# Patient Record
Sex: MALE | Race: BLACK OR AFRICAN AMERICAN | NOT HISPANIC OR LATINO | Employment: FULL TIME | URBAN - METROPOLITAN AREA
[De-identification: names, ages, dates, MRNs, and addresses within clinical notes are randomized per-mention and may not be internally consistent; named-entity substitution may affect disease eponyms.]

---

## 2018-08-28 ENCOUNTER — HOSPITAL ENCOUNTER (OUTPATIENT)
Dept: RADIOLOGY | Facility: HOSPITAL | Age: 43
Discharge: HOME | End: 2018-08-28
Attending: NURSE PRACTITIONER
Payer: MEDICAID

## 2018-08-28 DIAGNOSIS — Z00.00 ROUTINE GENERAL MEDICAL EXAMINATION AT A HEALTH CARE FACILITY: ICD-10-CM

## 2018-08-28 DIAGNOSIS — Z00.00 ROUTINE GENERAL MEDICAL EXAMINATION AT A HEALTH CARE FACILITY: Primary | ICD-10-CM

## 2018-08-28 PROCEDURE — 71046 X-RAY EXAM CHEST 2 VIEWS: CPT | Mod: TC,FY,PO

## 2018-12-17 DIAGNOSIS — J45.20 MILD INTERMITTENT ASTHMA IN ADULT WITHOUT COMPLICATION: Primary | ICD-10-CM

## 2019-01-07 ENCOUNTER — HOSPITAL ENCOUNTER (OUTPATIENT)
Dept: PULMONOLOGY | Facility: HOSPITAL | Age: 44
Discharge: HOME | End: 2019-01-07
Attending: INTERNAL MEDICINE
Payer: MEDICAID

## 2019-01-07 DIAGNOSIS — J45.20 MILD INTERMITTENT ASTHMA IN ADULT WITHOUT COMPLICATION: ICD-10-CM

## 2019-01-07 LAB
BRPFT: ABNORMAL
DLCO ADJ PRE: 26.82 ML/(MIN*MMHG) (ref 26.7–40.56)
DLCO SINGLE BREATH LLN: 26.7
DLCO SINGLE BREATH PRE REF: 79.8 %
DLCO SINGLE BREATH REF: 33.63
DLCOC SBVA LLN: 3.41
DLCOC SBVA PRE REF: 93.8 %
DLCOC SBVA REF: 4.61
DLCOC SINGLE BREATH LLN: 26.7
DLCOC SINGLE BREATH PRE REF: 79.8 %
DLCOC SINGLE BREATH REF: 33.63
DLCOVA LLN: 3.41
DLCOVA PRE REF: 93.8 %
DLCOVA PRE: 4.32 ML/(MIN*MMHG*L) (ref 3.41–5.8)
DLCOVA REF: 4.61
DLVAADJ PRE: 4.32 ML/(MIN*MMHG*L) (ref 3.41–5.8)
ERV LLN: 1.46
ERV REF: 1.46
ERVN2 LLN: 1.46
ERVN2 PRE REF: 41.6 %
ERVN2 PRE: 0.61 L (ref 1.46–1.46)
ERVN2 REF: 1.46
FEF 25 75 LLN: 1.85
FEF 25 75 PRE REF: 54 %
FEF 25 75 REF: 3.59
FEV1 FVC LLN: 71
FEV1 FVC PRE REF: 86.3 %
FEV1 FVC REF: 81
FEV1 LLN: 2.81
FEV1 PRE REF: 79.9 %
FEV1 REF: 3.67
FEV6 LLN: 3.45
FEV6 PRE REF: 95 %
FEV6 PRE: 4.18 L (ref 3.45–5.35)
FEV6 REF: 4.4
FRCN2 LLN: 2.5
FRCN2 PRE REF: 63.5 %
FRCN2 REF: 3.49
FRCPLETH LLN: 2.5
FRCPLETH REF: 3.49
FVC LLN: 3.53
FVC PRE REF: 92.2 %
FVC REF: 4.54
IVC PRE: 4.23 L (ref 3.53–5.55)
IVC SINGLE BREATH LLN: 3.53
IVC SINGLE BREATH PRE REF: 93.1 %
IVC SINGLE BREATH REF: 4.54
MVV LLN: 138
MVV REF: 162
PEF LLN: 6.69
PEF PRE REF: 100.4 %
PEF REF: 9.41
PRE DLCO: 26.82 ML/(MIN*MMHG) (ref 26.7–40.56)
PRE FEF 25 75: 1.94 L/S (ref 1.85–5.33)
PRE FET 100: 6.45 SEC
PRE FEV1 FVC: 69.88 % (ref 70.87–91.06)
PRE FEV1: 2.93 L (ref 2.81–4.52)
PRE FRC N2: 2.22 L
PRE FVC: 4.19 L (ref 3.53–5.55)
PRE PEF: 9.45 L/S (ref 6.69–12.13)
RAW LLN: 3.06
RAW REF: 3.06
RV LLN: 1.36
RV REF: 2.03
RVN2 LLN: 1.36
RVN2 PRE REF: 76 %
RVN2 PRE: 1.54 L (ref 1.36–2.7)
RVN2 REF: 2.03
RVN2TLCN2 LLN: 21
RVN2TLCN2 PRE REF: 89.4 %
RVN2TLCN2 PRE: 26.76 % (ref 20.97–38.93)
RVN2TLCN2 REF: 30
RVTLC LLN: 21
RVTLC REF: 30
TLC LLN: 6.15
TLC REF: 7.3
TLCN2 LLN: 6.15
TLCN2 PRE REF: 78.9 %
TLCN2 PRE: 5.76 L (ref 6.15–8.45)
TLCN2 REF: 7.3
VA PRE: 6.21 L (ref 7.15–7.15)
VA SINGLE BREATH LLN: 7.15
VA SINGLE BREATH PRE REF: 86.8 %
VA SINGLE BREATH REF: 7.15
VC LLN: 3.53
VC REF: 4.54
VCMAXN2 LLN: 3.53
VCMAXN2 PRE REF: 92.9 %
VCMAXN2 PRE: 4.22 L (ref 3.53–5.55)
VCMAXN2 REF: 4.54

## 2019-01-07 PROCEDURE — 94729 DIFFUSING CAPACITY: CPT

## 2019-01-07 PROCEDURE — 94727 GAS DIL/WSHOT DETER LNG VOL: CPT

## 2019-01-07 PROCEDURE — 94010 BREATHING CAPACITY TEST: CPT

## 2019-10-22 ENCOUNTER — TELEPHONE (OUTPATIENT)
Dept: OPHTHALMOLOGY | Facility: CLINIC | Age: 44
End: 2019-10-22

## 2019-10-22 NOTE — TELEPHONE ENCOUNTER
----- Message from Kelin Jimenez sent at 10/22/2019 11:56 AM CDT -----  Pt being referred by [facility] (Dr. Abbasi) for collagen cross linking     Pt contact [phone]

## 2019-11-05 ENCOUNTER — OFFICE VISIT (OUTPATIENT)
Dept: OPHTHALMOLOGY | Facility: CLINIC | Age: 44
End: 2019-11-05
Attending: OPHTHALMOLOGY
Payer: MEDICAID

## 2019-11-05 DIAGNOSIS — H18.603 KERATOCONUS OF BOTH EYES: Primary | ICD-10-CM

## 2019-11-05 PROCEDURE — 99999 PR PBB SHADOW E&M-EST. PATIENT-LVL II: CPT | Mod: PBBFAC,,, | Performed by: OPHTHALMOLOGY

## 2019-11-05 PROCEDURE — 92004 COMPRE OPH EXAM NEW PT 1/>: CPT | Mod: S$PBB,,, | Performed by: OPHTHALMOLOGY

## 2019-11-05 PROCEDURE — 99999 PR PBB SHADOW E&M-EST. PATIENT-LVL II: ICD-10-PCS | Mod: PBBFAC,,, | Performed by: OPHTHALMOLOGY

## 2019-11-05 PROCEDURE — 99212 OFFICE O/P EST SF 10 MIN: CPT | Mod: PBBFAC | Performed by: OPHTHALMOLOGY

## 2019-11-05 PROCEDURE — 92004 PR EYE EXAM, NEW PATIENT,COMPREHESV: ICD-10-PCS | Mod: S$PBB,,, | Performed by: OPHTHALMOLOGY

## 2019-11-05 NOTE — LETTER
November 5, 2019      June Lambert MD  [address] [facility] [address]           [facility] [place] [address] [facility] [address]  [address] [facility] [address]  Phone: [phone]  Fax: [phone]          Patient: Cristóbal Gruber   MR Number: [id number]   YOB: 1975   Date of Visit: 11/5/2019       Dear Dr. June Lambert:    Thank you for referring Cristóbal Gruber to me for evaluation. Attached you will find relevant portions of my assessment and plan of care.    If you have questions, please do not hesitate to call me. I look forward to following Cristóbal Gruber along with you.    Sincerely,    Amie Garcia MD    Enclosure  CC:  No Recipients    If you would like to receive this communication electronically, please contact [email or web address] or [phone] to request more information on [employer] access.    For providers and/or their staff who would like to refer a patient to [facility], please contact us through our one-stop-shop provider referral line, [facility], at [phone].    If you feel you have received this communication in error or would no longer like to receive these types of communications, please e-mail [email or web address]

## 2019-11-05 NOTE — PROGRESS NOTES
HPI     Patient reports today for a crosslinking eval in the left eye. Patient is   a current eye glass wearer and started wearing in the right eye due to the   Keratoconus and in order to avoid going through a corneal transplant.   Patient did not wear hard lens in today. He would like to see if he is a   candidate for crosslinking in hopes of stopping the progression of the   keratoconus .    Patient has be diagnosed a few years ago. In both eyes with the right   being the worse        Last edited by Arpan Michel on 11/5/2019 11:39 AM. (History)            Assessment /Plan     For exam results, see Encounter Report.    Keratoconus of both eyes      The diagnosis of keratoconus and its etiology and clinical course were discussed.  Treatment with the use of specialty contact lenses, collagen cross linking, and corneal transplantation was explained in detail.    This patient exhibits signs of progression of keratoconus and failure of conservative treatments with spectacles and/or contact lenses.   Disease progression is indicated by   - An increase of >1D in the Kmax  - An increase of >1D of astigmatism in the MRx  - An increase in myopia of >0.50D on MRx    I recommend treatment with Collagen Crosslinking using the Avedro FDA approved epi-off protocol with Photrexa and the KXL System, in order to stabilize this condition.    Plan:   KXL treatment OS, then consider OD but thin    I have informed the patient that we will seek insurance pre-approval, but due to the lack of reimbursement from June-Dec 2018, we will collect a fee for service deposit of $2,000 as pre payment.  If insurance should provide payment for the CXL drug and procedure, then we will refund the patient's deposit.  Plan for PAP

## 2019-11-15 ENCOUNTER — TELEPHONE (OUTPATIENT)
Dept: OPHTHALMOLOGY | Facility: CLINIC | Age: 44
End: 2019-11-15

## 2019-11-15 NOTE — TELEPHONE ENCOUNTER
Sent message through portal that we needed to reschedule his cxl procedure that is scheduled for 3/26/20. SDF

## 2019-12-04 ENCOUNTER — TELEPHONE (OUTPATIENT)
Dept: OPTOMETRY | Facility: CLINIC | Age: 44
End: 2019-12-04

## 2020-11-13 ENCOUNTER — OFFICE VISIT (OUTPATIENT)
Dept: OPHTHALMOLOGY | Facility: CLINIC | Age: 45
End: 2020-11-13
Payer: MEDICAID

## 2020-11-13 DIAGNOSIS — H18.603 KERATOCONUS OF BOTH EYES: Primary | ICD-10-CM

## 2020-11-13 DIAGNOSIS — H52.213 IRREGULAR ASTIGMATISM OF BOTH EYES: ICD-10-CM

## 2020-11-13 PROCEDURE — 99999 PR PBB SHADOW E&M-EST. PATIENT-LVL I: ICD-10-PCS | Mod: PBBFAC,,, | Performed by: OPHTHALMOLOGY

## 2020-11-13 PROCEDURE — 92014 PR EYE EXAM, EST PATIENT,COMPREHESV: ICD-10-PCS | Mod: S$PBB,,, | Performed by: OPHTHALMOLOGY

## 2020-11-13 PROCEDURE — 92025 CPTRIZED CORNEAL TOPOGRAPHY: CPT | Mod: PBBFAC | Performed by: OPHTHALMOLOGY

## 2020-11-13 PROCEDURE — 92014 COMPRE OPH EXAM EST PT 1/>: CPT | Mod: S$PBB,,, | Performed by: OPHTHALMOLOGY

## 2020-11-13 PROCEDURE — 99211 OFF/OP EST MAY X REQ PHY/QHP: CPT | Mod: PBBFAC,25 | Performed by: OPHTHALMOLOGY

## 2020-11-13 PROCEDURE — 92025 COMPUTERIZED CORNEAL TOPOGRAPHY: ICD-10-PCS | Mod: 26,S$PBB,, | Performed by: OPHTHALMOLOGY

## 2020-11-13 PROCEDURE — 99999 PR PBB SHADOW E&M-EST. PATIENT-LVL I: CPT | Mod: PBBFAC,,, | Performed by: OPHTHALMOLOGY

## 2020-11-13 NOTE — PROGRESS NOTES
HPI     Dr. Garcia pt/Dr. Lambert ref    KCN OU     Combigan OU BID    44 YO male here for kcn eval. Saw Dr. Garcia in past however was   transferring jobs at the time and held off cxl to see if ins at new job   would cover procedure. Present today to discuss cxl OS since OD not good   candidate for procedure. Pt reports he wears scleral C/L occasionally-does   not wear them full time. Pt feels that OS KCN is worsening. No eye pain   nor other ocular complaints.     Last edited by Fatou Louise on 11/13/2020  9:40 AM. (History)            Assessment /Plan     For exam results, see Encounter Report.    Keratoconus of both eyes  -     Computerized corneal topography    Irregular astigmatism of both eyes  -     Computerized corneal topography        The diagnosis of keratoconus and its etiology and clinical course were discussed.  Treatment with the use of specialty contact lenses, collagen cross linking, and corneal transplantation was explained in detail.    This patient exhibits signs of progression of keratoconus and failure of conservative treatments with spectacles and/or contact lenses.   Disease progression is indicated by   - An increase of >1D in the Kmax  - An increase of >1D of astigmatism in the MRx  - An increase in myopia of >0.50D on MRx    I recommend treatment with Collagen Crosslinking using the Avedro FDA approved epi-off protocol with Photrexa and the KXL System, in order to stabilize this condition.    Plan:   KXL treatment OS, then consider OD but thin    I have informed the patient that we will seek insurance pre-approval, but due to the lack of reimbursement from June-Dec 2018, we will collect a fee for service deposit of $2,000 as pre payment.  If insurance should provide payment for the CXL drug and procedure, then we will refund the patient's deposit.  Plan for PAP

## 2020-11-13 NOTE — LETTER
[facility] [address]  [address]  [facility] [address]  Phone: [phone]  Fax: [phone]   November 13, 2020    June Lambert MD  [address] [facility] [address]    Patient: Cristóbal Gruber   MR Number: [id number]   YOB: 1975   Date of Visit: 11/13/2020       Dear Dr. Lambert:    Thank you for referring Cristóbal Gruber to me for evaluation. Here is my assessment and plan of care:       Keratoconus of both eyes OD>OS     The diagnosis of keratoconus and its etiology and clinical course were discussed.  Treatment with the use of specialty contact lenses, collagen cross linking, and corneal transplantation was explained in detail.    This patient exhibits signs of progression of keratoconus and failure of conservative treatments with spectacles and/or contact lenses.   Disease progression is indicated by   - An increase of >1D in the Kmax  - An increase of >1D of astigmatism in the MRx  - An increase in myopia of >0.50D on MRx    I recommend treatment with Collagen Crosslinking using the Avedro FDA approved epi-off protocol with Photrexa and the KXL System, in order to stabilize this condition.    Plan:   KXL treatment OS, then consider OD but thin       Below you will find my full exam findings. If you have questions, please do not hesitate to call me. I look forward to following Mr. Cristóbal Gruber along with you.    Sincerely,        Kelly Iglesias MD       CC  No Recipients             Base Eye Exam     Visual Acuity (Snellen - Linear)       Right Left    Dist cc 20/500 20/20    Correction: Glasses          Tonometry (Tonopen, 9:40 AM)       Right Left    Pressure 14 16          Pachymetry (11/13/2020)       Right Left    Thickness 382 491          Neuro/Psych     Oriented x3: Yes    Mood/Affect: Normal            Slit Lamp and Fundus Exam     External Exam       Right Left    External Normal Normal          Slit Lamp Exam       Right Left    Lids/Lashes Normal Normal     Conjunctiva/Sclera White and quiet,pigmentation White and quiet, pigmenation    Cornea keratoconus , steep Thinning ,steep    Anterior Chamber Deep and quiet Deep and quiet    Iris Round and reactive Round and reactive    Lens trace nsc trace nsc

## 2020-11-13 NOTE — Clinical Note
November 13, 2020      June Lambert MD  [address] [facility] [address]           [facility] 1st Fl  [address]  [facility] [address]  Phone: [phone]  Fax: [phone]          Patient: Cristóbal Gruber   MR Number: [id number]   YOB: 1975   Date of Visit: 11/13/2020       Dear Dr. June Lambert:    Thank you for referring Cristóbal Gruber to me for evaluation. Attached you will find relevant portions of my assessment and plan of care.    If you have questions, please do not hesitate to call me. I look forward to following Cristóbal Gruber along with you.    Sincerely,    Kelly Iglesias MD    Enclosure  CC:  No Recipients    If you would like to receive this communication electronically, please contact [email or web address] or [phone] to request more information on [employer] Link access.    For providers and/or their staff who would like to refer a patient to [facility], please contact us through our one-stop-shop provider referral line, [facility], at [phone].    If you feel you have received this communication in error or would no longer like to receive these types of communications, please e-mail [email or web address]

## 2021-03-11 ENCOUNTER — TELEPHONE (OUTPATIENT)
Dept: OPHTHALMOLOGY | Facility: CLINIC | Age: 46
End: 2021-03-11

## 2021-04-16 ENCOUNTER — PATIENT MESSAGE (OUTPATIENT)
Dept: RESEARCH | Facility: HOSPITAL | Age: 46
End: 2021-04-16

## 2023-09-12 ENCOUNTER — TELEPHONE (OUTPATIENT)
Dept: OPHTHALMOLOGY | Facility: CLINIC | Age: 48
End: 2023-09-12
Payer: MEDICAID